# Patient Record
Sex: MALE | Race: WHITE
[De-identification: names, ages, dates, MRNs, and addresses within clinical notes are randomized per-mention and may not be internally consistent; named-entity substitution may affect disease eponyms.]

---

## 2018-12-01 ENCOUNTER — HOSPITAL ENCOUNTER (EMERGENCY)
Dept: HOSPITAL 25 - UCCORT | Age: 48
Discharge: HOME | End: 2018-12-01
Payer: COMMERCIAL

## 2018-12-01 VITALS — SYSTOLIC BLOOD PRESSURE: 148 MMHG | DIASTOLIC BLOOD PRESSURE: 92 MMHG

## 2018-12-01 DIAGNOSIS — J35.8: Primary | ICD-10-CM

## 2018-12-01 PROCEDURE — 99211 OFF/OP EST MAY X REQ PHY/QHP: CPT

## 2018-12-01 PROCEDURE — 87651 STREP A DNA AMP PROBE: CPT

## 2018-12-01 PROCEDURE — G0463 HOSPITAL OUTPT CLINIC VISIT: HCPCS

## 2018-12-01 NOTE — UC
Throat Pain/Nasal Chad HPI





- HPI Summary


HPI Summary: 





Patient woke up with a sore throat, afraid he has the flu





- History of Current Complaint


Chief Complaint: UCGeneralIllness


Stated Complaint: THROAT COMPLAINT


Time Seen by Provider: 12/01/18 10:59


Hx Obtained From: Patient


Onset/Duration: Sudden Onset, Lasting Hours


Severity: Moderate


Pain Intensity: 5


Associated Signs & Symptoms: Positive: Dysphagia





- Allergies/Home Medications


Allergies/Adverse Reactions: 


 Allergies











Allergy/AdvReac Type Severity Reaction Status Date / Time


 


No Known Allergies Allergy   Verified 12/01/18 10:50











Home Medications: 


 Home Medications





NK [No Home Medications Reported]  12/01/18 [History Confirmed 12/01/18]











PMH/Surg Hx/FS Hx/Imm Hx


Previously Healthy: Yes





- Surgical History


Surgical History: None





- Family History


Known Family History: 


   Negative: Cardiac Disease, Hypertension





- Social History


Alcohol Use: Rare


Substance Use Type: None


Smoking Status (MU): Never Smoked Tobacco


When Did the Patient Quit Smoking/Using Tobacco: 3 months ago





- Immunization History


Most Recent Influenza Vaccination: none





Review of Systems


All Other Systems Reviewed And Are Negative: Yes


Constitutional: Positive: Negative


Skin: Positive: Negative


Eyes: Positive: Negative


ENT: Positive: Sore Throat


Respiratory: Positive: Negative


Cardiovascular: Positive: Negative


Gastrointestinal: Positive: Negative


Genitourinary: Positive: Negative


Motor: Positive: Negative


Neurovascular: Positive: Negative


Musculoskeletal: Positive: Negative


Neurological: Positive: Negative


Psychological: Positive: Negative


Is Patient Immunocompromised?: No





Physical Exam


Triage Information Reviewed: Yes


Appearance: Well-Appearing, Well-Nourished, Pain Distress


Vital Signs: 


 Initial Vital Signs











Temp  98.1 F   12/01/18 10:47


 


Pulse  80   12/01/18 10:47


 


Resp  18   12/01/18 10:47


 


BP  148/92   12/01/18 10:47


 


Pulse Ox  100   12/01/18 10:47











Vital Signs Reviewed: Yes


Eye Exam: Normal


ENT: Positive: TMs normal, Tonsillar swelling - of left tonsil, large 

tonsilolith noted


Dental Exam: Normal


Neck exam: Normal


Respiratory Exam: Normal


Respiratory: Positive: Chest non-tender, Lungs clear, Normal breath sounds


Cardiovascular Exam: Normal


Cardiovascular: Positive: RRR, No Murmur, Pulses Normal


Abdominal Exam: Normal


Abdomen Description: Positive: Nontender, No Organomegaly, Soft


Musculoskeletal Exam: Normal


Neurological Exam: Normal


Psychological Exam: Normal


Skin Exam: Normal





Throat Pain/Nasal Course/Dx





- Course


Course Of Treatment: hx obtained, exam performed ,meds reviewed, removed tonsil 

stone with tongue depressor. educated patient on tonsil stones.





- Differential Dx/Diagnosis


Provider Diagnosis: 


 Tonsil stone








Discharge





- Sign-Out/Discharge


Documenting (check all that apply): Patient Departure


All imaging exams completed and their final reports reviewed: No Studies





- Discharge Plan


Condition: Stable


Disposition: HOME


Referrals: 


No Primary Care Phys,NOPCP [Primary Care Provider] - 


Additional Instructions: 


1. salt water gargles


2. Increase fluid intake and get plenty of rest


3. Advil or tylneol for pain and aches








- Billing Disposition and Condition


Condition: STABLE


Disposition: Home

## 2020-03-12 ENCOUNTER — HOSPITAL ENCOUNTER (EMERGENCY)
Dept: HOSPITAL 25 - UCCORT | Age: 50
Discharge: HOME | End: 2020-03-12
Payer: COMMERCIAL

## 2020-03-12 VITALS — SYSTOLIC BLOOD PRESSURE: 151 MMHG | DIASTOLIC BLOOD PRESSURE: 101 MMHG
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
## 2020-03-12 DIAGNOSIS — Z87.891: ICD-10-CM

## 2020-03-12 DIAGNOSIS — S60.212A: Primary | ICD-10-CM

## 2020-03-12 PROCEDURE — 99211 OFF/OP EST MAY X REQ PHY/QHP: CPT

## 2020-03-12 PROCEDURE — G0463 HOSPITAL OUTPT CLINIC VISIT: HCPCS

## 2020-03-12 NOTE — UC
Hand/Wrist HPI





- HPI Summary


HPI Summary: 


39-year-old male presents with complaints of left wrist pain.  Patient states 

he fell at work 3 weeks ago and landed on an outstretched arm.  States 

initially he had some bruising and swelling over the radial wrist that has 

since resolved.  States pain has improved however has been persistent.  Notes 

pain is worse with supination and pronation.  Denies any numbness or tingling.








- History Of Current Complaint


Chief Complaint: UCUpperExtremity


Stated Complaint: S/P FALL LEFT ARM INJURY


Time Seen by Provider: 03/12/20 11:45


Hx Obtained From: Patient


Pain Intensity: 0





- Allergies/Home Medications


Allergies/Adverse Reactions: 


 Allergies











Allergy/AdvReac Type Severity Reaction Status Date / Time


 


No Known Allergies Allergy   Verified 03/12/20 11:45











Home Medications: 


 Home Medications





NK [No Home Medications Reported]  12/01/18 [History Confirmed 03/12/20]











PMH/Surg Hx/FS Hx/Imm Hx


Previously Healthy: Yes - Denies significant PMH





- Surgical History


Surgical History: None





- Family History


Known Family History: 


   Negative: Cardiac Disease, Hypertension





- Social History


Occupation: Employed Full-time


Lives: With Family


Alcohol Use: Occasionally


Substance Use Type: None


Smoking Status (MU): Former Smoker


When Did the Patient Quit Smoking/Using Tobacco: 1 months ago





- Immunization History


Most Recent Influenza Vaccination: none





Review of Systems


All Other Systems Reviewed And Are Negative: Yes


Constitutional: Positive: Negative


Skin: Positive: Bruising


Respiratory: Positive: Negative


Cardiovascular: Positive: Negative


Gastrointestinal: Positive: Negative


Genitourinary: Positive: Negative


Motor: Negative: Weakness


Neurovascular: Negative: Decreased Sensation


Musculoskeletal: Positive: Other: - See HPI


Neurological/Mental Status: Positive: Negative


Is Patient Immunocompromised?: No





Physical Exam





- Summary


Physical Exam Summary: 


GENERAL APPEARANCE: Well developed, well nourished, alert and cooperative, and 

appears to be in no acute distress.





CARDIAC: Normal S1 and S2. No S3, S4 or murmurs. Rhythm is regular. There is no 

peripheral edema, cyanosis or pallor. Extremities are warm and well perfused. 

Capillary refill is less than 2 seconds. Peripheral pulses intact.





LUNGS: Clear to auscultation without rales, rhonchi, wheezing or diminished 

breath sounds.





ABDOMEN: Positive bowel sounds. Soft, nondistended, nontender. No guarding or 

rebound. No masses or hepatosplenomegally.





MUSKULOSKELETAL: Normal muscular development. Normal gait.





EXTREMITIES: Anatomical snuffbox tenderness. No gross deformity, ecchymosis, 

erythema or edema. Full ROM. Circulation and sensation intact.





SKIN: Skin normal color, texture and turgor with no lesions or eruptions.





Triage Information Reviewed: Yes


Vital Signs: 


 Initial Vital Signs











Temp  99.1 F   03/12/20 11:46


 


Pulse  100   03/12/20 11:46


 


Resp  15   03/12/20 11:46


 


BP  151/101   03/12/20 11:46


 


Pulse Ox  100   03/12/20 11:46











Vital Signs Reviewed: Yes





Diagnostics





- Radiology


  ** No standard instances


Radiology Interpretation Completed By: Radiologist


Summary of Radiographic Findings: Order Information: WRIST LEFT 3+ VWS.  

Indication: Left wrist pain.  3 views of the wrist demonstrates no fracture. No 

other bone or joint abnormality is identified.  IMPRESSION: NO FRACTURE OF THE 

WRIST IS NOTED.





Hand/Wrist Course/Dx





- Course


Course Of Treatment: 


39-year-old male presents with complaints of left wrist pain.  Patient states 

he fell at work 3 weeks ago and landed on an outstretched arm.  States 

initially he had some bruising and swelling over the radial wrist that has 

since resolved.  States pain has improved however has been persistent.  Notes 

pain is worse with supination and pronation.  Denies any numbness or tingling.  

Afebrile.  Hypertensive otherwise vital signs stable.  X-ray showed no acute 

fracture or dislocation.  Reviewed results with the patient.  With his 

persistent pain and tenderness over the anatomic snuffbox I still have a 

suspicion for a scaphoid fracture and I'm recommending further evaluation by 

orthopedic surgery within 3-5 days.  Patient states that he has a thumb spica 

splint at home and he has been encouraged to use this until he is evaluated by 

orthopedic.  Also recommending conservative treatment including RICE and over-

the-counter analgesics.  Anticipatory guidance and warning symptoms were 

reviewed with the patient.  Verbalizes understanding and agrees with plan of 

care.








- Differential Dx/Diagnosis


Differential Diagnosis/HQI/PQRI: Contusion, Dislocation, Fracture, Sprain


Provider Diagnosis: 


 Left wrist injury








Discharge ED





- Sign-Out/Discharge


Documenting (check all that apply): Patient Departure


All imaging exams completed and their final reports reviewed: Yes





- Discharge Plan


Condition: Stable


Disposition: HOME


Patient Education Materials:  Wrist Sprain (ED)


Referrals: 


Murali Prado MD [Medical Doctor] - 3 Days (Call for appointment.)


No Primary Care Phys,NOPCP [Primary Care Provider] - 


[facility] [place]


Additional Instructions: 


The x-ray performed in the clinic today showed no evidence of a fracture 

however with your persistent pain I would like you to be further evaluated by 

orthopedic surgery.





Rest the hand as much as possible. Wear the splint you have at home. You may 

remove to shower but should wear at all other times.





Apply ice to the affected area for 15-20 minutes at least 4 times a day to help 

with the pain and swelling.





Elevate the arm to help reduce swelling.





Take acetaminophen (Tylenol) or ibuprofen (Advil, Motrin) according to 

directions as needed for pain.





Follow up with orthopedic surgery in 3-5 days for further evaluation. Call for 

appointment.





Your blood pressure was elevated in the clinic today. It is recommended that 

you follow up with a primary care provider within the next 4 weeks to have this 

rechecked.  I have provided you with the contact information for the [facility] physician referral service if you need assistance with 

establishing with a provider.





Seek immediate medical attention if you have severe pain not managed with pain 

medication, you are unable to walk or bear any weight, develop numbness or 

tingling in the hand or fingers, or have any worsening of symptoms.





- Billing Disposition and Condition


Condition: STABLE


Disposition: Home